# Patient Record
Sex: FEMALE | Race: WHITE | NOT HISPANIC OR LATINO
[De-identification: names, ages, dates, MRNs, and addresses within clinical notes are randomized per-mention and may not be internally consistent; named-entity substitution may affect disease eponyms.]

---

## 2020-08-04 PROBLEM — Z00.00 ENCOUNTER FOR PREVENTIVE HEALTH EXAMINATION: Status: ACTIVE | Noted: 2020-08-04

## 2020-08-17 ENCOUNTER — APPOINTMENT (OUTPATIENT)
Dept: PLASTIC SURGERY | Facility: CLINIC | Age: 25
End: 2020-08-17
Payer: COMMERCIAL

## 2020-08-17 PROCEDURE — 99203 OFFICE O/P NEW LOW 30 MIN: CPT | Mod: 95

## 2020-08-26 NOTE — REASON FOR VISIT
[Initial Evaluation] : an initial evaluation [FreeTextEntry1] : Patient presents to the office today for FFS consult.

## 2020-09-14 ENCOUNTER — APPOINTMENT (OUTPATIENT)
Dept: CT IMAGING | Facility: CLINIC | Age: 25
End: 2020-09-14
Payer: COMMERCIAL

## 2020-09-14 ENCOUNTER — RESULT REVIEW (OUTPATIENT)
Age: 25
End: 2020-09-14

## 2020-09-14 ENCOUNTER — OUTPATIENT (OUTPATIENT)
Dept: OUTPATIENT SERVICES | Facility: HOSPITAL | Age: 25
LOS: 1 days | End: 2020-09-14

## 2020-09-14 PROCEDURE — 70486 CT MAXILLOFACIAL W/O DYE: CPT | Mod: 26

## 2020-10-05 ENCOUNTER — TRANSCRIPTION ENCOUNTER (OUTPATIENT)
Age: 25
End: 2020-10-05

## 2020-10-05 ENCOUNTER — APPOINTMENT (OUTPATIENT)
Dept: PLASTIC SURGERY | Facility: CLINIC | Age: 25
End: 2020-10-05
Payer: COMMERCIAL

## 2020-10-05 DIAGNOSIS — Z01.818 ENCOUNTER FOR OTHER PREPROCEDURAL EXAMINATION: ICD-10-CM

## 2020-10-05 PROCEDURE — 99213 OFFICE O/P EST LOW 20 MIN: CPT | Mod: 95

## 2020-10-06 PROBLEM — Z01.818 PREOP EXAMINATION: Status: ACTIVE | Noted: 2020-10-06

## 2020-10-06 NOTE — PHYSICAL EXAM
[de-identified] : Alert, calm, cooperative. [de-identified] : Prominent brow and lateral orbital rim type III [de-identified] : Slightly wide, bulbous nasal tip with acute nasolabial angle. Prominent mandibular angle with active masseter. Wide chin. [de-identified] : + prominent tracheal bulge ("Eliseo's Apple") [de-identified] : Respirations even and unlabored.

## 2020-10-06 NOTE — HISTORY OF PRESENT ILLNESS
[FreeTextEntry1] : Mel is a 25 year old transgender female patient that presents to the office today for a pre operative appointment for upcoming facial feminization surgery. Patient would like to add rhinoplasty and genioplasty to surgery.

## 2020-10-06 NOTE — REASON FOR VISIT
[Home] : at home, [unfilled] , at the time of the visit. [Medical Office: ([facility])___] : at the medical office located in  [Verbal consent obtained from patient] : the patient, [unfilled] [FreeTextEntry1] : pre operative appointment for FFS

## 2020-10-10 ENCOUNTER — LABORATORY RESULT (OUTPATIENT)
Age: 25
End: 2020-10-10

## 2020-10-10 ENCOUNTER — APPOINTMENT (OUTPATIENT)
Dept: DISASTER EMERGENCY | Facility: CLINIC | Age: 25
End: 2020-10-10

## 2020-10-10 DIAGNOSIS — Z01.818 ENCOUNTER FOR OTHER PREPROCEDURAL EXAMINATION: ICD-10-CM

## 2020-10-12 ENCOUNTER — TRANSCRIPTION ENCOUNTER (OUTPATIENT)
Age: 25
End: 2020-10-12

## 2020-10-12 VITALS
OXYGEN SATURATION: 98 % | HEART RATE: 77 BPM | RESPIRATION RATE: 16 BRPM | TEMPERATURE: 98 F | WEIGHT: 119.93 LBS | DIASTOLIC BLOOD PRESSURE: 83 MMHG | HEIGHT: 66 IN | SYSTOLIC BLOOD PRESSURE: 116 MMHG

## 2020-10-12 RX ORDER — OXYCODONE 5 MG/1
5 TABLET ORAL
Qty: 18 | Refills: 0 | Status: COMPLETED | COMMUNITY
Start: 2020-10-12 | End: 2020-10-15

## 2020-10-13 ENCOUNTER — INPATIENT (INPATIENT)
Facility: HOSPITAL | Age: 25
LOS: 1 days | Discharge: HOME | DRG: 876 | End: 2020-10-15
Attending: SURGERY | Admitting: SURGERY
Payer: MEDICAID

## 2020-10-13 ENCOUNTER — APPOINTMENT (OUTPATIENT)
Dept: PLASTIC SURGERY | Facility: HOSPITAL | Age: 25
End: 2020-10-13
Payer: COMMERCIAL

## 2020-10-13 PROCEDURE — 30410 RECONSTRUCTION OF NOSE: CPT

## 2020-10-13 PROCEDURE — 21172 RCNST SUPR-LAT ORB RM&LW FHD: CPT | Mod: 50

## 2020-10-13 PROCEDURE — 21256 RECONSTRUCTION OF ORBIT: CPT

## 2020-10-13 PROCEDURE — 30520 REPAIR OF NASAL SEPTUM: CPT

## 2020-10-13 PROCEDURE — 31899 UNLISTED PX TRACHEA BRONCHI: CPT

## 2020-10-13 PROCEDURE — 21122 GENIOP SLDG OSTEOT 2/>: CPT

## 2020-10-13 PROCEDURE — 21139 RDCTJ FOREHEAD CNTRG&SETBACK: CPT

## 2020-10-13 PROCEDURE — 15824 RHYTIDECTOMY FOREHEAD: CPT

## 2020-10-13 RX ORDER — OXYCODONE HYDROCHLORIDE 5 MG/1
5 TABLET ORAL EVERY 4 HOURS
Refills: 0 | Status: DISCONTINUED | OUTPATIENT
Start: 2020-10-13 | End: 2020-10-15

## 2020-10-13 RX ORDER — OXYCODONE HYDROCHLORIDE 5 MG/1
10 TABLET ORAL EVERY 4 HOURS
Refills: 0 | Status: DISCONTINUED | OUTPATIENT
Start: 2020-10-13 | End: 2020-10-15

## 2020-10-13 RX ORDER — CEFAZOLIN SODIUM 1 G
1000 VIAL (EA) INJECTION EVERY 8 HOURS
Refills: 0 | Status: DISCONTINUED | OUTPATIENT
Start: 2020-10-13 | End: 2020-10-15

## 2020-10-13 RX ORDER — ACETAMINOPHEN 500 MG
650 TABLET ORAL EVERY 6 HOURS
Refills: 0 | Status: DISCONTINUED | OUTPATIENT
Start: 2020-10-13 | End: 2020-10-15

## 2020-10-13 RX ORDER — SODIUM CHLORIDE 9 MG/ML
1000 INJECTION, SOLUTION INTRAVENOUS
Refills: 0 | Status: DISCONTINUED | OUTPATIENT
Start: 2020-10-13 | End: 2020-10-15

## 2020-10-13 RX ORDER — ACETAMINOPHEN 500 MG
1000 TABLET ORAL ONCE
Refills: 0 | Status: DISCONTINUED | OUTPATIENT
Start: 2020-10-13 | End: 2020-10-13

## 2020-10-13 RX ORDER — GABAPENTIN 400 MG/1
300 CAPSULE ORAL ONCE
Refills: 0 | Status: COMPLETED | OUTPATIENT
Start: 2020-10-13 | End: 2020-10-13

## 2020-10-13 RX ORDER — METOCLOPRAMIDE HCL 10 MG
10 TABLET ORAL EVERY 6 HOURS
Refills: 0 | Status: DISCONTINUED | OUTPATIENT
Start: 2020-10-13 | End: 2020-10-15

## 2020-10-13 RX ORDER — CALCIUM CARBONATE 500(1250)
1 TABLET ORAL THREE TIMES A DAY
Refills: 0 | Status: DISCONTINUED | OUTPATIENT
Start: 2020-10-13 | End: 2020-10-15

## 2020-10-13 RX ORDER — MAGNESIUM SULFATE 500 MG/ML
2 VIAL (ML) INJECTION ONCE
Refills: 0 | Status: COMPLETED | OUTPATIENT
Start: 2020-10-13 | End: 2020-10-13

## 2020-10-13 RX ORDER — DEXAMETHASONE 0.5 MG/5ML
8 ELIXIR ORAL EVERY 12 HOURS
Refills: 0 | Status: COMPLETED | OUTPATIENT
Start: 2020-10-13 | End: 2020-10-15

## 2020-10-13 RX ORDER — ONDANSETRON 8 MG/1
4 TABLET, FILM COATED ORAL EVERY 8 HOURS
Refills: 0 | Status: DISCONTINUED | OUTPATIENT
Start: 2020-10-13 | End: 2020-10-15

## 2020-10-13 RX ORDER — METOCLOPRAMIDE HCL 10 MG
10 TABLET ORAL EVERY 6 HOURS
Refills: 0 | Status: DISCONTINUED | OUTPATIENT
Start: 2020-10-13 | End: 2020-10-13

## 2020-10-13 RX ORDER — BENZOCAINE AND MENTHOL 5; 1 G/100ML; G/100ML
1 LIQUID ORAL THREE TIMES A DAY
Refills: 0 | Status: DISCONTINUED | OUTPATIENT
Start: 2020-10-13 | End: 2020-10-15

## 2020-10-13 RX ORDER — HYDROMORPHONE HYDROCHLORIDE 2 MG/ML
0.5 INJECTION INTRAMUSCULAR; INTRAVENOUS; SUBCUTANEOUS
Refills: 0 | Status: DISCONTINUED | OUTPATIENT
Start: 2020-10-13 | End: 2020-10-13

## 2020-10-13 RX ORDER — ACETAMINOPHEN 500 MG
1000 TABLET ORAL ONCE
Refills: 0 | Status: COMPLETED | OUTPATIENT
Start: 2020-10-13 | End: 2020-10-13

## 2020-10-13 RX ORDER — HYDROMORPHONE HYDROCHLORIDE 2 MG/ML
0.5 INJECTION INTRAMUSCULAR; INTRAVENOUS; SUBCUTANEOUS
Refills: 0 | Status: DISCONTINUED | OUTPATIENT
Start: 2020-10-13 | End: 2020-10-15

## 2020-10-13 RX ORDER — DIPHENHYDRAMINE HCL 50 MG
25 CAPSULE ORAL EVERY 4 HOURS
Refills: 0 | Status: DISCONTINUED | OUTPATIENT
Start: 2020-10-13 | End: 2020-10-15

## 2020-10-13 RX ORDER — ONDANSETRON 8 MG/1
4 TABLET, FILM COATED ORAL ONCE
Refills: 0 | Status: COMPLETED | OUTPATIENT
Start: 2020-10-13 | End: 2020-10-14

## 2020-10-13 RX ADMIN — Medication 50 GRAM(S): at 19:03

## 2020-10-13 RX ADMIN — Medication 650 MILLIGRAM(S): at 21:59

## 2020-10-13 RX ADMIN — OXYCODONE HYDROCHLORIDE 10 MILLIGRAM(S): 5 TABLET ORAL at 18:01

## 2020-10-13 RX ADMIN — OXYCODONE HYDROCHLORIDE 10 MILLIGRAM(S): 5 TABLET ORAL at 23:42

## 2020-10-13 RX ADMIN — Medication 1000 MILLIGRAM(S): at 15:10

## 2020-10-13 RX ADMIN — HYDROMORPHONE HYDROCHLORIDE 0.5 MILLIGRAM(S): 2 INJECTION INTRAMUSCULAR; INTRAVENOUS; SUBCUTANEOUS at 19:53

## 2020-10-13 RX ADMIN — HYDROMORPHONE HYDROCHLORIDE 0.5 MILLIGRAM(S): 2 INJECTION INTRAMUSCULAR; INTRAVENOUS; SUBCUTANEOUS at 14:07

## 2020-10-13 RX ADMIN — Medication 650 MILLIGRAM(S): at 22:59

## 2020-10-13 RX ADMIN — OXYCODONE HYDROCHLORIDE 10 MILLIGRAM(S): 5 TABLET ORAL at 17:01

## 2020-10-13 RX ADMIN — ONDANSETRON 4 MILLIGRAM(S): 8 TABLET, FILM COATED ORAL at 17:40

## 2020-10-13 RX ADMIN — Medication 101.6 MILLIGRAM(S): at 17:40

## 2020-10-13 RX ADMIN — OXYCODONE HYDROCHLORIDE 10 MILLIGRAM(S): 5 TABLET ORAL at 22:42

## 2020-10-13 RX ADMIN — HYDROMORPHONE HYDROCHLORIDE 0.5 MILLIGRAM(S): 2 INJECTION INTRAMUSCULAR; INTRAVENOUS; SUBCUTANEOUS at 14:30

## 2020-10-13 RX ADMIN — HYDROMORPHONE HYDROCHLORIDE 0.5 MILLIGRAM(S): 2 INJECTION INTRAMUSCULAR; INTRAVENOUS; SUBCUTANEOUS at 14:33

## 2020-10-13 RX ADMIN — HYDROMORPHONE HYDROCHLORIDE 0.5 MILLIGRAM(S): 2 INJECTION INTRAMUSCULAR; INTRAVENOUS; SUBCUTANEOUS at 20:10

## 2020-10-13 RX ADMIN — HYDROMORPHONE HYDROCHLORIDE 0.5 MILLIGRAM(S): 2 INJECTION INTRAMUSCULAR; INTRAVENOUS; SUBCUTANEOUS at 16:03

## 2020-10-13 RX ADMIN — Medication 400 MILLIGRAM(S): at 14:34

## 2020-10-13 RX ADMIN — HYDROMORPHONE HYDROCHLORIDE 0.5 MILLIGRAM(S): 2 INJECTION INTRAMUSCULAR; INTRAVENOUS; SUBCUTANEOUS at 15:10

## 2020-10-13 RX ADMIN — BENZOCAINE AND MENTHOL 1 LOZENGE: 5; 1 LIQUID ORAL at 19:13

## 2020-10-13 RX ADMIN — GABAPENTIN 300 MILLIGRAM(S): 400 CAPSULE ORAL at 16:13

## 2020-10-13 RX ADMIN — Medication 10 MILLIGRAM(S): at 20:39

## 2020-10-13 RX ADMIN — HYDROMORPHONE HYDROCHLORIDE 0.5 MILLIGRAM(S): 2 INJECTION INTRAMUSCULAR; INTRAVENOUS; SUBCUTANEOUS at 15:30

## 2020-10-13 RX ADMIN — Medication 100 MILLIGRAM(S): at 20:39

## 2020-10-13 NOTE — BRIEF OPERATIVE NOTE - NSICDXBRIEFPROCEDURE_GEN_ALL_CORE_FT
PROCEDURES:  Complete rhinoplasty 13-Oct-2020 14:25:42  Lynn Hagen  Genioplasty 13-Oct-2020 14:24:01  Lynn Hagen  Chondrolaryngoplasty 13-Oct-2020 14:23:48  Lynn Hagen  Repair, orbital rim 13-Oct-2020 14:21:34  Lynn Hagen  Browlift 13-Oct-2020 14:20:39  Lynn Hagen  Orbital reconstruction 13-Oct-2020 14:20:03  Lynn Hagen  Contouring, forehead, anterior frontal sinus wall, for reduction and setback 13-Oct-2020 14:19:46  Lynn Hagen

## 2020-10-13 NOTE — PACU DISCHARGE NOTE - COMMENTS
Pt met criteria for discharge- able to tolerate sips of water and apple juice w/p N/V- expresses relief s/p PRN Pain medications- hemodynamically stable- TEJA draining small amount of sero-sanguinous output- due to void by 8 to 9pm- report given to 5wollman nurse- pt left unit via bed to 540

## 2020-10-14 RX ADMIN — ONDANSETRON 4 MILLIGRAM(S): 8 TABLET, FILM COATED ORAL at 02:09

## 2020-10-14 RX ADMIN — ONDANSETRON 4 MILLIGRAM(S): 8 TABLET, FILM COATED ORAL at 09:08

## 2020-10-14 RX ADMIN — Medication 650 MILLIGRAM(S): at 04:34

## 2020-10-14 RX ADMIN — OXYCODONE HYDROCHLORIDE 10 MILLIGRAM(S): 5 TABLET ORAL at 18:03

## 2020-10-14 RX ADMIN — HYDROMORPHONE HYDROCHLORIDE 0.5 MILLIGRAM(S): 2 INJECTION INTRAMUSCULAR; INTRAVENOUS; SUBCUTANEOUS at 15:34

## 2020-10-14 RX ADMIN — OXYCODONE HYDROCHLORIDE 10 MILLIGRAM(S): 5 TABLET ORAL at 19:00

## 2020-10-14 RX ADMIN — Medication 650 MILLIGRAM(S): at 10:08

## 2020-10-14 RX ADMIN — Medication 10 MILLIGRAM(S): at 15:07

## 2020-10-14 RX ADMIN — Medication 10 MILLIGRAM(S): at 05:22

## 2020-10-14 RX ADMIN — OXYCODONE HYDROCHLORIDE 10 MILLIGRAM(S): 5 TABLET ORAL at 08:18

## 2020-10-14 RX ADMIN — Medication 100 MILLIGRAM(S): at 13:20

## 2020-10-14 RX ADMIN — HYDROMORPHONE HYDROCHLORIDE 0.5 MILLIGRAM(S): 2 INJECTION INTRAMUSCULAR; INTRAVENOUS; SUBCUTANEOUS at 02:20

## 2020-10-14 RX ADMIN — Medication 650 MILLIGRAM(S): at 05:34

## 2020-10-14 RX ADMIN — OXYCODONE HYDROCHLORIDE 10 MILLIGRAM(S): 5 TABLET ORAL at 22:25

## 2020-10-14 RX ADMIN — HYDROMORPHONE HYDROCHLORIDE 0.5 MILLIGRAM(S): 2 INJECTION INTRAMUSCULAR; INTRAVENOUS; SUBCUTANEOUS at 02:06

## 2020-10-14 RX ADMIN — Medication 100 MILLIGRAM(S): at 21:55

## 2020-10-14 RX ADMIN — HYDROMORPHONE HYDROCHLORIDE 0.5 MILLIGRAM(S): 2 INJECTION INTRAMUSCULAR; INTRAVENOUS; SUBCUTANEOUS at 09:15

## 2020-10-14 RX ADMIN — Medication 100 MILLIGRAM(S): at 04:34

## 2020-10-14 RX ADMIN — Medication 650 MILLIGRAM(S): at 19:00

## 2020-10-14 RX ADMIN — HYDROMORPHONE HYDROCHLORIDE 0.5 MILLIGRAM(S): 2 INJECTION INTRAMUSCULAR; INTRAVENOUS; SUBCUTANEOUS at 15:21

## 2020-10-14 RX ADMIN — OXYCODONE HYDROCHLORIDE 10 MILLIGRAM(S): 5 TABLET ORAL at 07:17

## 2020-10-14 RX ADMIN — Medication 101.6 MILLIGRAM(S): at 18:03

## 2020-10-14 RX ADMIN — HYDROMORPHONE HYDROCHLORIDE 0.5 MILLIGRAM(S): 2 INJECTION INTRAMUSCULAR; INTRAVENOUS; SUBCUTANEOUS at 09:08

## 2020-10-14 RX ADMIN — Medication 650 MILLIGRAM(S): at 18:03

## 2020-10-14 RX ADMIN — Medication 650 MILLIGRAM(S): at 09:08

## 2020-10-14 RX ADMIN — OXYCODONE HYDROCHLORIDE 10 MILLIGRAM(S): 5 TABLET ORAL at 23:22

## 2020-10-14 RX ADMIN — Medication 650 MILLIGRAM(S): at 23:49

## 2020-10-14 RX ADMIN — OXYCODONE HYDROCHLORIDE 10 MILLIGRAM(S): 5 TABLET ORAL at 11:58

## 2020-10-14 RX ADMIN — Medication 101.6 MILLIGRAM(S): at 05:44

## 2020-10-14 RX ADMIN — OXYCODONE HYDROCHLORIDE 10 MILLIGRAM(S): 5 TABLET ORAL at 12:58

## 2020-10-14 NOTE — PROGRESS NOTE ADULT - ASSESSMENT
24 y/o F POD # 1s/p FFS w/ brow set back, rhinoplasty, tracheal shave, genioplasty. Clinically improving    Pain/ Nausea Control  FLD, advance as tolerated  Dressing down and drain out POD # 2

## 2020-10-15 ENCOUNTER — TRANSCRIPTION ENCOUNTER (OUTPATIENT)
Age: 25
End: 2020-10-15

## 2020-10-15 VITALS
HEART RATE: 84 BPM | TEMPERATURE: 98 F | SYSTOLIC BLOOD PRESSURE: 110 MMHG | RESPIRATION RATE: 18 BRPM | DIASTOLIC BLOOD PRESSURE: 75 MMHG | OXYGEN SATURATION: 99 %

## 2020-10-15 PROBLEM — F64.9 GENDER IDENTITY DISORDER, UNSPECIFIED: Chronic | Status: ACTIVE | Noted: 2020-10-12

## 2020-10-15 RX ORDER — IBUPROFEN 200 MG
3 TABLET ORAL
Qty: 0 | Refills: 0 | DISCHARGE
Start: 2020-10-15 | End: 2020-10-17

## 2020-10-15 RX ORDER — OXYCODONE HYDROCHLORIDE 5 MG/1
1 TABLET ORAL
Qty: 0 | Refills: 0 | DISCHARGE
Start: 2020-10-15

## 2020-10-15 RX ORDER — ONDANSETRON 8 MG/1
1 TABLET, FILM COATED ORAL
Qty: 15 | Refills: 0
Start: 2020-10-15 | End: 2020-10-19

## 2020-10-15 RX ORDER — ACETAMINOPHEN 500 MG
2 TABLET ORAL
Qty: 0 | Refills: 0 | DISCHARGE
Start: 2020-10-15

## 2020-10-15 RX ADMIN — Medication 650 MILLIGRAM(S): at 00:49

## 2020-10-15 RX ADMIN — Medication 650 MILLIGRAM(S): at 05:30

## 2020-10-15 RX ADMIN — Medication 650 MILLIGRAM(S): at 10:52

## 2020-10-15 RX ADMIN — Medication 100 MILLIGRAM(S): at 05:29

## 2020-10-15 RX ADMIN — Medication 101.6 MILLIGRAM(S): at 06:16

## 2020-10-15 RX ADMIN — OXYCODONE HYDROCHLORIDE 10 MILLIGRAM(S): 5 TABLET ORAL at 06:18

## 2020-10-15 RX ADMIN — Medication 650 MILLIGRAM(S): at 06:30

## 2020-10-15 NOTE — DISCHARGE NOTE PROVIDER - HOSPITAL COURSE
26 yo transgender F underwent facial feminization surgery for gender dysphoria. Patient tolerated the procedure well and had uneventful postoperative hospital course.  On the day of the discharge, patient was evaluated and deemed in stable condition to be discharged to home. Follow up in one week with Dr. Nelson in the office.

## 2020-10-15 NOTE — DISCHARGE NOTE PROVIDER - NSDCMRMEDTOKEN_GEN_ALL_CORE_FT
acetaminophen 325 mg oral tablet: 2 tab(s) orally every 6 hours  ibuprofen 200 mg oral tablet: 3 tab(s) orally every 8 hours  oxyCODONE 5 mg oral tablet: 1 tab(s) orally every 4 hours, As needed, Moderate Pain (4 - 6)

## 2020-10-15 NOTE — DISCHARGE NOTE PROVIDER - CARE PROVIDER_API CALL
Kranthi Nelson (MD)  Plastic Surgery; Surgery  [address], [address]  [place] [address]  Phone: [phone]  Fax: [phone]  Follow Up Time:

## 2020-10-15 NOTE — DISCHARGE NOTE PROVIDER - NSDCCPCAREPLAN_GEN_ALL_CORE_FT
PRINCIPAL DISCHARGE DIAGNOSIS  Diagnosis: Gender dysphoria  Assessment and Plan of Treatment: *Please refer to the post-operative care instructions provided from  Omar's office.   -Follow up with Plastic Surgeon,  Omar in 1 week in the office.      -Take oxycodone as prescribed for pain control.  -Take ibuprofen 600 mg three times daily for 2 days.     -Diet: no restrictions.     -You may shower, but keep nasal splint dry  -No heavy lifting >20 pounds or strenuous exercises.  -Call Doctor’s office or return to ER if: fever (temperature >101.4F), chills, chest pain, shortness of breath, uncontrolled/severe pain, persistent nausea/vomiting, or bleeding/oozing/redness/swelling at incision sites.

## 2020-10-15 NOTE — DISCHARGE NOTE NURSING/CASE MANAGEMENT/SOCIAL WORK - PATIENT PORTAL LINK FT
You can access the FollowMyHealth Patient Portal offered by [facility] by registering at the following website: [email or web address]. By joining [facility]’s FollowMyHealth portal, you will also be able to view your health information using other applications (apps) compatible with our system.

## 2020-10-16 ENCOUNTER — APPOINTMENT (OUTPATIENT)
Dept: PLASTIC SURGERY | Facility: CLINIC | Age: 25
End: 2020-10-16

## 2020-10-20 DIAGNOSIS — Z91.018 ALLERGY TO OTHER FOODS: ICD-10-CM

## 2020-10-20 DIAGNOSIS — F64.9 GENDER IDENTITY DISORDER, UNSPECIFIED: ICD-10-CM

## 2020-10-23 ENCOUNTER — APPOINTMENT (OUTPATIENT)
Dept: PLASTIC SURGERY | Facility: CLINIC | Age: 25
End: 2020-10-23
Payer: COMMERCIAL

## 2020-10-23 VITALS
WEIGHT: 111 LBS | BODY MASS INDEX: 17.84 KG/M2 | DIASTOLIC BLOOD PRESSURE: 83 MMHG | HEART RATE: 88 BPM | SYSTOLIC BLOOD PRESSURE: 119 MMHG | HEIGHT: 66 IN

## 2020-10-23 PROCEDURE — 99024 POSTOP FOLLOW-UP VISIT: CPT

## 2020-10-26 NOTE — HISTORY OF PRESENT ILLNESS
[FreeTextEntry1] : Mel is a 25 year old transgender female patient that presents to the office today for a post operative evaluation s/p facial feminization (Frontal sinus setback, Orbital reconstruction bilaterally with ostectomy and bone grafting, Brow lift bilaterally, hairline shortening, Bilateral supraorbital contouring of bone with pineapple bur, Tracheal shave or tracheal laryngoplasty, Osseous genioplasty narrowing advancement, Open rhinoplasty with cartilage graft for bilateral  grafts, tip plasty, dorsal hump reduction, Submucous resection of deviated septum with resection of cartilage and bone deviation) on 10/13/2020. She states that she feels a bump on her right brown bone. Patient denies having any other concerns or complaints.\par

## 2020-10-26 NOTE — REASON FOR VISIT
[Post Op: _________] : a [unfilled] post op visit [FreeTextEntry1] : s/p facial feminization surgery on 10/13/2020

## 2020-10-26 NOTE — PHYSICAL EXAM
[de-identified] : Alert, calm, cooperative. [de-identified] : Hairline incision is well approximated with no signs of wound dehiscence or fluctuance. Moderate edema and mild ecchymosis noted.\par  [de-identified] : Nonexposed dissolvable pin/plate palpated on right brow bone. \par  [de-identified] : Nasal incisions are well approximated with no signs of wound dehiscence or fluctuance. Moderate edema and mild ecchymosis noted. Oral incisions are well approximated with no signs of wound dehiscence or fluctuance. Mild edema and mild ecchymosis noted.\par \par  [de-identified] : Tracheal shave incision is well approximated with no signs of wound dehiscence or fluctuance. Moderate edema and mild ecchymosis noted.\par  [de-identified] : Respirations even and unlabored.

## 2020-10-29 PROCEDURE — 86901 BLOOD TYPING SEROLOGIC RH(D): CPT

## 2020-10-29 PROCEDURE — 86900 BLOOD TYPING SEROLOGIC ABO: CPT

## 2020-10-29 PROCEDURE — C1713: CPT

## 2020-10-29 PROCEDURE — C1889: CPT

## 2020-10-29 PROCEDURE — 86850 RBC ANTIBODY SCREEN: CPT

## 2020-11-13 ENCOUNTER — APPOINTMENT (OUTPATIENT)
Dept: PLASTIC SURGERY | Facility: CLINIC | Age: 25
End: 2020-11-13

## 2020-11-13 DIAGNOSIS — Z09 ENCOUNTER FOR FOLLOW-UP EXAMINATION AFTER COMPLETED TREATMENT FOR CONDITIONS OTHER THAN MALIGNANT NEOPLASM: ICD-10-CM

## 2020-11-15 PROBLEM — Z09 POSTOPERATIVE EXAMINATION: Status: ACTIVE | Noted: 2020-10-06

## 2020-11-15 NOTE — PHYSICAL EXAM
[de-identified] : Alert, calm, cooperative. [de-identified] : Hairline incision with no signs of wound dehiscence or fluctuance. Mild edema and mild ecchymosis but improving.\par  [de-identified] : \par  [de-identified] : Nasal incisions healing well. Mild edema but improving. Oral incisions with no signs of wound dehiscence or fluctuance.\par \par  [de-identified] : Tracheal shave incision healing well. \par  [de-identified] : Respirations even and unlabored.

## 2020-11-15 NOTE — HISTORY OF PRESENT ILLNESS
[FreeTextEntry1] : Mel is a 25 year old transgender female patient that presents to the office today for a post operative evaluation s/p facial feminization (Frontal sinus setback, Orbital reconstruction bilaterally with ostectomy and bone grafting, Brow lift bilaterally, hairline shortening, Bilateral supraorbital contouring of bone with pineapple bur, Tracheal shave or tracheal laryngoplasty, Osseous genioplasty narrowing advancement, Open rhinoplasty with cartilage graft for bilateral  grafts, tip plasty, dorsal hump reduction, Submucous resection of deviated septum with resection of cartilage and bone deviation) on 10/13/2020. Patient is happy with the results. Patient denies having any concerns or complaints.\par

## 2021-10-04 ENCOUNTER — APPOINTMENT (OUTPATIENT)
Dept: PLASTIC SURGERY | Facility: CLINIC | Age: 26
End: 2021-10-04
Payer: COMMERCIAL

## 2021-10-04 DIAGNOSIS — F64.9 GENDER IDENTITY DISORDER, UNSPECIFIED: ICD-10-CM

## 2021-10-04 PROCEDURE — 99213 OFFICE O/P EST LOW 20 MIN: CPT

## 2021-10-04 PROCEDURE — 99072 ADDL SUPL MATRL&STAF TM PHE: CPT

## 2022-10-12 ENCOUNTER — OUTPATIENT (OUTPATIENT)
Dept: OUTPATIENT SERVICES | Facility: HOSPITAL | Age: 27
LOS: 1 days | Discharge: STILL ADMITTED OR ADMINISTRATIVE | End: 2022-10-12

## 2022-10-20 PROCEDURE — 99214 OFFICE O/P EST MOD 30 MIN: CPT | Mod: 95

## 2022-10-26 DIAGNOSIS — F43.10 POST-TRAUMATIC STRESS DISORDER, UNSPECIFIED: ICD-10-CM

## 2022-11-28 ENCOUNTER — OUTPATIENT (OUTPATIENT)
Dept: OUTPATIENT SERVICES | Facility: HOSPITAL | Age: 27
LOS: 1 days | Discharge: STILL ADMITTED OR ADMINISTRATIVE | End: 2022-11-28
Payer: MEDICAID

## 2022-11-28 PROCEDURE — 90839 PSYTX CRISIS INITIAL 60 MIN: CPT | Mod: 95

## 2022-11-30 PROCEDURE — 90839 PSYTX CRISIS INITIAL 60 MIN: CPT | Mod: 95

## 2022-12-15 PROCEDURE — 99214 OFFICE O/P EST MOD 30 MIN: CPT | Mod: 95

## 2022-12-28 DIAGNOSIS — F43.10 POST-TRAUMATIC STRESS DISORDER, UNSPECIFIED: ICD-10-CM
